# Patient Record
Sex: FEMALE | Race: OTHER | HISPANIC OR LATINO | Employment: FULL TIME | URBAN - METROPOLITAN AREA
[De-identification: names, ages, dates, MRNs, and addresses within clinical notes are randomized per-mention and may not be internally consistent; named-entity substitution may affect disease eponyms.]

---

## 2020-08-26 ENCOUNTER — APPOINTMENT (OUTPATIENT)
Dept: URGENT CARE | Facility: PHYSICIAN GROUP | Age: 20
End: 2020-08-26

## 2021-06-11 ENCOUNTER — TELEPHONE (OUTPATIENT)
Dept: SCHEDULING | Facility: IMAGING CENTER | Age: 21
End: 2021-06-11

## 2021-06-15 ENCOUNTER — OFFICE VISIT (OUTPATIENT)
Dept: MEDICAL GROUP | Facility: MEDICAL CENTER | Age: 21
End: 2021-06-15
Payer: COMMERCIAL

## 2021-06-15 VITALS
HEIGHT: 65 IN | DIASTOLIC BLOOD PRESSURE: 58 MMHG | TEMPERATURE: 97.3 F | WEIGHT: 145 LBS | OXYGEN SATURATION: 98 % | HEART RATE: 76 BPM | BODY MASS INDEX: 24.16 KG/M2 | SYSTOLIC BLOOD PRESSURE: 98 MMHG

## 2021-06-15 DIAGNOSIS — N92.0 MENORRHAGIA WITH REGULAR CYCLE: ICD-10-CM

## 2021-06-15 DIAGNOSIS — M54.9 LEFT-SIDED BACK PAIN, UNSPECIFIED BACK LOCATION, UNSPECIFIED CHRONICITY: ICD-10-CM

## 2021-06-15 DIAGNOSIS — M54.50 ACUTE LEFT-SIDED LOW BACK PAIN WITHOUT SCIATICA: ICD-10-CM

## 2021-06-15 DIAGNOSIS — F33.1 MODERATE EPISODE OF RECURRENT MAJOR DEPRESSIVE DISORDER (HCC): ICD-10-CM

## 2021-06-15 LAB
APPEARANCE UR: NORMAL
BILIRUB UR STRIP-MCNC: NEGATIVE MG/DL
COLOR UR AUTO: YELLOW
GLUCOSE UR STRIP.AUTO-MCNC: NEGATIVE MG/DL
KETONES UR STRIP.AUTO-MCNC: NEGATIVE MG/DL
LEUKOCYTE ESTERASE UR QL STRIP.AUTO: NEGATIVE
NITRITE UR QL STRIP.AUTO: NEGATIVE
PH UR STRIP.AUTO: 6 [PH] (ref 5–8)
PROT UR QL STRIP: NEGATIVE MG/DL
RBC UR QL AUTO: NORMAL
SP GR UR STRIP.AUTO: >=1.03
UROBILINOGEN UR STRIP-MCNC: NORMAL MG/DL

## 2021-06-15 PROCEDURE — 81002 URINALYSIS NONAUTO W/O SCOPE: CPT | Performed by: FAMILY MEDICINE

## 2021-06-15 PROCEDURE — 99204 OFFICE O/P NEW MOD 45 MIN: CPT | Performed by: FAMILY MEDICINE

## 2021-06-15 RX ORDER — NORETHINDRONE ACETATE AND ETHINYL ESTRADIOL .02; 1 MG/1; MG/1
1 TABLET ORAL DAILY
Qty: 84 TABLET | Refills: 3 | Status: SHIPPED | OUTPATIENT
Start: 2021-06-15 | End: 2021-10-25

## 2021-06-15 ASSESSMENT — PATIENT HEALTH QUESTIONNAIRE - PHQ9: CLINICAL INTERPRETATION OF PHQ2 SCORE: 0

## 2021-06-15 NOTE — PROGRESS NOTES
Subjective:     CC: Diagnoses of Left-sided back pain, unspecified back location, unspecified chronicity, Menorrhagia with regular cycle, Acute left-sided low back pain without sciatica, and Moderate episode of recurrent major depressive disorder (HCC) were pertinent to this visit.    HPI: Patient is a 20 y.o. female new patient who presents today to establish care.       Menorrhagia with regular cycle  Has severe pain with menstruation  Common to have vomiting  Unable to work on the first day  Very heavy.   Periods last about 5-7 days.   Has never been on birth control.   Use condoms.   Has been painful for years.   Denies any migraines with aura, reports she does get rather severe headaches but that is rare and never with aura.   No family history of blood clots or bleeding disorders.     Acute left-sided low back pain without sciatica  New problem. Patient was concerned about possible UTI.   However, pain is positional. Comes and goes. No pain with urination. UA was normal in office today.   No pain while sitting in the office today.     Moderate episode of recurrent major depressive disorder (HCC)  Patient reports long history of feeling depressed.   Used to have SI, but not for years.   Never been to therapy.   Would like referral for behavioral health  Feels blank/empty, hard time getting out of bed.   She works at a warehouse.   Has a boyfriend, good relationship. Family is here as well.       History reviewed. No pertinent past medical history.    Social History     Tobacco Use   • Smoking status: Never Smoker   • Smokeless tobacco: Never Used   Vaping Use   • Vaping Use: Never used   Substance Use Topics   • Alcohol use: Not Currently   • Drug use: Yes     Types: Marijuana       Current Outpatient Medications Ordered in Epic   Medication Sig Dispense Refill   • norethindrone-ethinyl estradiol (LOESTRIN 1/20, 21,) 1-20 MG-MCG per tablet Take 1 tablet by mouth every day. 84 tablet 3     No current  "Epic-ordered facility-administered medications on file.       Allergies:  Blueberry flavor    Health Maintenance: Completed    ROS:  Gen: no fevers/chill, no changes in weight  Eyes: no changes in vision  ENT: no sore throat, no hearing loss, no bloody nose  Pulm: no sob, no cough  CV: no chest pain, no palpitations  GI: no nausea/vomiting, no diarrhea  : no dysuria  MSk: no myalgias  Skin: no rash  Neuro: no headaches, no numbness/tingling  Heme/Lymph: no easy bruising      Objective:       Exam:  BP (!) 98/58 (BP Location: Right arm, Patient Position: Sitting, BP Cuff Size: Adult long)   Pulse 76   Temp 36.3 °C (97.3 °F) (Temporal)   Ht 1.651 m (5' 5\")   Wt 65.8 kg (145 lb)   LMP 05/23/2021   SpO2 98%   BMI 24.13 kg/m²  Body mass index is 24.13 kg/m².      General: Normal appearing. No distress.  HEAD: NCAT  EYES: conjunctiva clear, lids without ptosis, pupils equal  and reactive to light  EARS: ears normal shape and contour, R TM with cerumen impaction. L TM clear.   MOUTH: oropharynx is without erythema, edema or exudates.   Neck: Supple without masses. Thyroid is not enlarged. Normal ROM  Pulmonary: Clear to ausculation.  Normal effort. No rales, ronchi, or wheezing.  Cardiovascular: Regular rate and rhythm, no murmur. No LE edema  Neurologic: Grossly normal, no focal deficits  Lymph: No cervical or supraclavicular lymph nodes are palpable  Skin: Warm and dry.  No obvious lesions.  Musculoskeletal: Normal gait and station.   Psych: Normal mood and affect. Alert and oriented x3. Judgment and insight is normal.    Assessment & Plan:     20 y.o. female with the following -     1. Left-sided back pain, unspecified back location, unspecified chronicity  New problem.  UA clear.  Advised this is very unlikely to be UTI or kidney infection.  She is otherwise completely asymptomatic.  Pain is positional.  Comes and goes.  Likely musculoskeletal.  Advised some stretching and exercise.  - POCT Urinalysis  - Comp " Metabolic Panel; Future    2. Menorrhagia with regular cycle  Chronic problem, present for years.  Patient reports extremely heavy and painful periods.  Plan to start OCPs.  Ordered labs.  Plan follow-up in 2 months.  - TSH WITH REFLEX TO FT4; Future  - CBC WITH DIFFERENTIAL; Future  - norethindrone-ethinyl estradiol (LOESTRIN 1/20, 21,) 1-20 MG-MCG per tablet; Take 1 tablet by mouth every day.  Dispense: 84 tablet; Refill: 3    3. Moderate episode of recurrent major depressive disorder (HCC)  Chronic problem, new to discuss.  Patient reports that she has struggled with depression for many years.  Denies any suicidal ideation.  Hoping to start therapy.  Declines medication at this point.  Plan to follow-up in 2 month.  - REFERRAL TO BEHAVIORAL HEALTH      Return in about 4 weeks (around 7/13/2021) for For well woman exam/Pap smear..    Please note that this dictation was created using voice recognition software. I have made every reasonable attempt to correct obvious errors, but I expect that there are errors of grammar and possibly content that I did not discover before finalizing the note.

## 2021-06-15 NOTE — ASSESSMENT & PLAN NOTE
New problem. Patient was concered about possible UTI.   However, pain is positional.   No pain while sitting in the office today.

## 2021-06-15 NOTE — ASSESSMENT & PLAN NOTE
Has severe pain with menstruation  Common to have vomiting  Unable to work on the first day  Very heavy.   Periods last about 5-7 days.   Has never been on birth control.   Use condoms.   Has been painful for years.   Denies any migraines with aura, reports she does get rather severe headaches but that is rare and never with aura.   No family history of blood clots or bleeding disorders.

## 2021-06-15 NOTE — ASSESSMENT & PLAN NOTE
Patient reports long history of feeling depressed.   Used to have SI, but not for years.   Never been to therapy.   Would like referral for behavioral health  Feels blank/empty, hard time getting out of bed.   She works at a warehouse.   Has a boyfriend, good relationship. Family is here as well.

## 2021-07-21 ENCOUNTER — TELEPHONE (OUTPATIENT)
Dept: MEDICAL GROUP | Facility: MEDICAL CENTER | Age: 21
End: 2021-07-21

## 2021-10-25 ENCOUNTER — OFFICE VISIT (OUTPATIENT)
Dept: URGENT CARE | Facility: PHYSICIAN GROUP | Age: 21
End: 2021-10-25
Payer: COMMERCIAL

## 2021-10-25 ENCOUNTER — APPOINTMENT (OUTPATIENT)
Dept: RADIOLOGY | Facility: IMAGING CENTER | Age: 21
End: 2021-10-25
Attending: PHYSICIAN ASSISTANT
Payer: COMMERCIAL

## 2021-10-25 VITALS
HEART RATE: 66 BPM | SYSTOLIC BLOOD PRESSURE: 102 MMHG | HEIGHT: 65 IN | TEMPERATURE: 98.4 F | BODY MASS INDEX: 22.66 KG/M2 | DIASTOLIC BLOOD PRESSURE: 70 MMHG | OXYGEN SATURATION: 97 % | RESPIRATION RATE: 18 BRPM | WEIGHT: 136 LBS

## 2021-10-25 DIAGNOSIS — S92.421A CLOSED DISPLACED FRACTURE OF DISTAL PHALANX OF RIGHT GREAT TOE, INITIAL ENCOUNTER: ICD-10-CM

## 2021-10-25 DIAGNOSIS — M79.674 GREAT TOE PAIN, RIGHT: ICD-10-CM

## 2021-10-25 PROCEDURE — 99214 OFFICE O/P EST MOD 30 MIN: CPT | Performed by: PHYSICIAN ASSISTANT

## 2021-10-25 PROCEDURE — 73660 X-RAY EXAM OF TOE(S): CPT | Mod: TC,RT | Performed by: PHYSICIAN ASSISTANT

## 2021-10-25 NOTE — PROGRESS NOTES
"  Subjective:   Caterina Fall is a 20 y.o. female who presents today with   Chief Complaint   Patient presents with   • Foot Injury     pain right foot,swollen,pt dropped a box on her right foot,1 day        Foot Problem  This is a new problem. The current episode started today. The problem occurs constantly. The problem has been unchanged. The symptoms are aggravated by bending and walking. She has tried nothing for the symptoms. The treatment provided no relief.   Patient states she dropped of approximately 10 pound box onto her right great toe as she was moving boxes around her house.    PMH:  has no past medical history on file.  MEDS:   Current Outpatient Medications:   •  norethindrone-ethinyl estradiol (LOESTRIN 1/20, 21,) 1-20 MG-MCG per tablet, Take 1 tablet by mouth every day. (Patient not taking: Reported on 10/25/2021), Disp: 84 tablet, Rfl: 3  ALLERGIES:   Allergies   Allergen Reactions   • Blueberry Flavor      SURGHX: No past surgical history on file.  SOCHX:  reports that she has never smoked. She has never used smokeless tobacco. She reports previous alcohol use. She reports current drug use. Drug: Marijuana.  FH: Reviewed with patient, not pertinent to this visit.       Review of Systems   Musculoskeletal:        Right great toe pain        Objective:   /70 (BP Location: Left arm, Patient Position: Sitting, BP Cuff Size: Adult)   Pulse 66   Temp 36.9 °C (98.4 °F) (Temporal)   Resp 18   Ht 1.651 m (5' 5\")   Wt 61.7 kg (136 lb)   SpO2 97%   BMI 22.63 kg/m²   Physical Exam  Vitals and nursing note reviewed.   Constitutional:       General: She is not in acute distress.     Appearance: Normal appearance. She is well-developed. She is not ill-appearing or toxic-appearing.   HENT:      Head: Normocephalic and atraumatic.      Right Ear: Hearing normal.      Left Ear: Hearing normal.   Cardiovascular:      Rate and Rhythm: Normal rate.   Pulmonary:      Effort: Pulmonary effort is normal. "   Musculoskeletal:        Feet:       Comments: Tenderness palpation to the first digit of the right foot.  Neurovascular intact distally.  Less than 2 capillary refill distally.  Patient has full plantarflexion and dorsiflexion of the right foot but not without pain in the right toe.   Skin:     General: Skin is warm and dry.   Neurological:      Mental Status: She is alert.      Coordination: Coordination normal.   Psychiatric:         Mood and Affect: Mood normal.       DX TOE  FINDINGS:  2 mm linear shaped bone fragment appears to be present dorsal to the distal end of the tuft of the distal phalanx of the first digit. No other potential fractures are identified. There is no evidence of dislocation or malalignment.     IMPRESSION:     1.  Probable mildly displaced fracture from the dorsal aspect of the distal end of the distal phalanx of the first digit.  Assessment/Plan:   Assessment    1. Great toe pain, right  - DX-TOE(S) 2+ RIGHT; Future    2. Closed displaced fracture of distal phalanx of right great toe, initial encounter  Patient given walking boot today and recommend she wear this for the next 2 to 4 weeks and slowly wean off using regular shoes.  Recommend follow-up with orthopedic if symptoms not improving or follow-up with her primary care.  Differential diagnosis, natural history, supportive care, and indications for immediate follow-up discussed.   Patient given instructions and understanding of medications and treatment.    Rest, ice, elevate.  Patient agreeable to plan.  Greater than 30 minutes were spent reviewing patient's chart, examining and obtaining history from patient, and discussing plan of care.       Please note that this dictation was created using voice recognition software. I have made every reasonable attempt to correct obvious errors, but I expect that there are errors of grammar and possibly content that I did not discover before finalizing the note.    Fredi Kan PA-C

## 2021-10-28 ENCOUNTER — OCCUPATIONAL MEDICINE (OUTPATIENT)
Dept: URGENT CARE | Facility: PHYSICIAN GROUP | Age: 21
End: 2021-10-28
Payer: COMMERCIAL

## 2021-10-28 VITALS
OXYGEN SATURATION: 98 % | DIASTOLIC BLOOD PRESSURE: 72 MMHG | SYSTOLIC BLOOD PRESSURE: 108 MMHG | RESPIRATION RATE: 18 BRPM | HEIGHT: 65 IN | WEIGHT: 136 LBS | HEART RATE: 72 BPM | BODY MASS INDEX: 22.66 KG/M2 | TEMPERATURE: 98 F

## 2021-10-28 DIAGNOSIS — S92.421D CLOSED DISPLACED FRACTURE OF DISTAL PHALANX OF RIGHT GREAT TOE WITH ROUTINE HEALING, SUBSEQUENT ENCOUNTER: ICD-10-CM

## 2021-10-28 PROCEDURE — 99214 OFFICE O/P EST MOD 30 MIN: CPT | Performed by: PHYSICIAN ASSISTANT

## 2021-10-28 ASSESSMENT — PAIN SCALES - GENERAL: PAINLEVEL: 2=MINIMAL-SLIGHT

## 2021-10-28 NOTE — PROGRESS NOTES
"Subjective     Caterina Fall is a 20 y.o. female who presents with Toe Pain (wc new, 10/25/21, fracture of big toe, swelling. )      DOI 10/25/2021: Patient was initially seen on October 25 and states today that initially she feared that she would get in trouble for reporting as a work-related injury.  She returns today and states instead of the box dropping on her foot at home it indeed happened at work.  She states it occurred while she was receiving her cart and putting away 2 boxes the item and the box was loose and moved falling onto her foot hitting her right big toe.  She was seen on October 25 in urgent care and had x-rays done that showed probable distal phalanx fracture of the right great toe.  She was given a walking boot at that time.  She states she has been using ibuprofen a couple of times a day as needed and ice as well.  She has been using a crutch to ambulate more comfortably.  She has been resting and elevating the area.  She attempted to go to work  but was sent home because she could not tolerate standing.  They created a position to where she could sit at a desk doing desk work and this was much more comfortable for her.  Patient states pain is improving but the toe does have some new bruising.     HPI  Patient presents today for work-related injury as described above.  Review of Systems   Musculoskeletal:        Right great toe pain       PMH: No pertinent past medical history to this problem  MEDS: Medications were reviewed in Epic  ALLERGIES: Allergies were reviewed in Epic  SOCHX: Works as a   FH: No pertinent family history to this problem         Objective     /72   Pulse 72   Temp 36.7 °C (98 °F) (Temporal)   Resp 18   Ht 1.651 m (5' 5\")   Wt 61.7 kg (136 lb)   SpO2 98%   BMI 22.63 kg/m²      Physical Exam  Vitals and nursing note reviewed.   Constitutional:       General: She is not in acute distress.     Appearance: Normal appearance. She is well-developed. She is " not ill-appearing or toxic-appearing.   HENT:      Head: Normocephalic and atraumatic.      Right Ear: Hearing normal.      Left Ear: Hearing normal.   Cardiovascular:      Rate and Rhythm: Normal rate.   Pulmonary:      Effort: Pulmonary effort is normal.   Musculoskeletal:      Comments: Right great toe as described below   Skin:     General: Skin is warm and dry.   Neurological:      Mental Status: She is alert.      Coordination: Coordination normal.   Psychiatric:         Mood and Affect: Mood normal.         Tenderness to palpation of the distal aspect of the right great toe.  Neurovascular intact distally.  Less than 2 capillary refill.  Ecchymosis to the distal portion of the right great toe with mild swelling.  Patient is able to flex and extend against resistance with the right great toe.  Antalgic gait favoring the right side.  Using a crutch for ambulation.              Assessment & Plan   1. Closed displaced fracture of distal phalanx of right great toe with routine healing, subsequent encounter         Patient should continue wearing the walking boot and using the crutch as needed for ambulation.  Recommend slowly weaning off of the walking boot in about a week.  Reevaluate in 2 weeks at that time may trial walking without the walking boot.  Suspect that probable fracture will heal in 2 to 4 weeks.  Continue with rest, ice, elevation and ibuprofen or Tylenol per 's instructions over-the-counter.  Please note that this dictation was created using voice recognition software. I have made every reasonable attempt to correct obvious errors, but I expect that there may be errors of grammar and possibly content that I did not discover before finalizing the note.   Greater than 30 minutes were spent reviewing patient's chart, examining and obtaining history from patient, and discussing plan of care.

## 2021-10-28 NOTE — LETTER
[facility]  [address] [place] [address]  Phone:  [phone] - Fax:  [phone]   [facility] Progress Report and Disability Certification  Date of Service: 10/28/2021   No Show:  No  Date / Time of Next Visit: 11/11/2021   Claim Information   Patient Name: Caterina Fall  Claim Number:     Employer:   [employer] Date of Injury: 10/25/2021     Insurer / TPA: [employer]  ID / SSN:     Occupation:   Diagnosis: The encounter diagnosis was Closed displaced fracture of distal phalanx of right great toe with routine healing, subsequent encounter.    Medical Information   Related to Industrial Injury? Yes    Subjective Complaints:  DOI 10/25/2021: Patient was initially seen on October 25 and states today that initially she feared that she would get in trouble for reporting as a work-related injury.  She returns today and states instead of the box dropping on her foot at home it indeed happened at work.  She states it occurred while she was receiving her cart and putting away 2 boxes the item and the box was loose and moved falling onto her foot hitting her right big toe.  She was seen on October 25 in urgent care and had x-rays done that showed probable distal phalanx fracture of the right great toe.  She was given a walking boot at that time.  She states she has been using ibuprofen a couple of times a day as needed and ice as well.  She has been using a crutch to ambulate more comfortably.  She has been resting and elevating the area.  She attempted to go to work  but was sent home because she could not tolerate standing.  They created a position to where she could sit at a desk doing desk work and this was much more comfortable for her.  Patient states pain is improving but the toe does have some new bruising.   Objective Findings: Tenderness to palpation of the distal aspect of the right great toe.  Neurovascular intact distally.  Less than 2  capillary refill.  Ecchymosis to the distal portion of the right great toe with mild swelling.  Patient is able to flex and extend against resistance with the right great toe.  Antalgic gait favoring the right side.  Using a crutch for ambulation.   Pre-Existing Condition(s):     Assessment:   Initial Visit    Status: Additional Care Required  Permanent Disability:No    Plan:      Diagnostics:      Comments:       Disability Information   Status: Released to Restricted Duty    From:  10/28/2021  Through: 2021 Restrictions are: Temporary   Physical Restrictions   Sitting:    Standing:  < or = to 2 hrs/day Stooping:    Bending:      Squatting:    Walking:  < or = to 4 hrs/day Climbin hrs/day Pushing:      Pulling:    Other:    Reaching Above Shoulder (L):   Reaching Above Shoulder (R):       Reaching Below Shoulder (L):    Reaching Below Shoulder (R):      Not to exceed Weight Limits   Carrying(hrs):   Weight Limit(lb):   Lifting(hrs):   Weight  Limit(lb):     Comments: Patient should continue wearing the walking boot and using the crutch as needed for ambulation.  Recommend slowly weaning off of the walking boot in about a week.  Reevaluate in 2 weeks at that time may trial walking without the walking boot.  Suspect that probable fracture will heal in 2 to 4 weeks.  Continue with rest, ice, elevation and ibuprofen or Tylenol per 's instructions over-the-counter.    Repetitive Actions   Hands: i.e. Fine Manipulations from Grasping:     Feet: i.e. Operating Foot Controls:     Driving / Operate Machinery:     Health Care Provider’s Original or Electronic Signature  Fredi Kan P.A.-C. Health Care Provider’s Original or Electronic Signature    Donny Hatch MD         Clinic Name / Location: [address] [address] Clinic Phone Number: Dept: [phone]   Appointment Time: 2:10 Pm Visit Start Time: 2:40 PM   Check-In Time:  2:27 Pm Visit Discharge Time:  3:01 Pm    Original-Treating Physician or Chiropractor    Page 2-Insurer/TPA    Page 3-Employer    Page 4-Employee

## 2021-10-28 NOTE — LETTER
"EMPLOYEE’S CLAIM FOR COMPENSATION/ REPORT OF INITIAL TREATMENT  FORM C-4    EMPLOYEE’S CLAIM - PROVIDE ALL INFORMATION REQUESTED   First Name  Caterina Last Name  Juan David Birthdate                    2000                Sex  female Claim Number (Insurer’s Use Only)    Home Address  [address] Age  20 y.o. Height  1.651 m (5' 5\") Weight  61.7 kg (136 lb) [facility]     [facility] Zip  [address] Telephone  [phone] (home)    Mailing Address  [address] [facility] Zip  [address] Primary Language Spoken  English    Insurer   Third-Party   [employer]   Employee's Occupation (Job Title) When Injury or Occupational Disease Occurred      Employer's Name/Company Name    [employer] Telephone  [phone]    Office Mail Address (Number and Street)   [address]  Zip  [address]    Date of Injury  10/25/2021               Hours Injury  2:20 PM Date Employer Notified  10/25/2021 Last Day of Work after Injury     or Occupational Disease  10/28/2021 Supervisor to Whom Injury     Reported  Jenni MICHELLE   Address or Location of Accident (if applicable)  Work [1]   What were you doing at the time of accident? (if applicable)  receiving a cart    How did this injury or occupational disease occur? (Be specific an answer in detail. Use additional sheet if necessary)  while receiving my cart I was putting away 2 wade boxes away in a B location and the Item in the box moved falling on to my foot hitting my right big toe   If you believe that you have an occupational disease, when did you first have knowledge of the disability and it relationship to your employment?  N/A Witnesses to the Accident  Humberto CASTRO      Nature of Injury or Occupational Disease  Fracture  Part(s) of Body Injured or Affected  Toe (R), Foot (R), N/A    I certify that the above is true and correct " to the best of my knowledge and that I have provided this information in order to obtain the benefits of [place]  I hereby authorize any physician, chiropractor, surgeon, practitioner, or other person, any hospital, including [facility] or [facility], any medical service organization, any insurance company, or other institution or organization to release to each other, any medical or other information, including benefits paid or payable, pertinent to this injury or disease, except information relative to diagnosis, treatment and/or counseling for AIDS, psychological conditions, alcohol or controlled substances, for which I must give specific authorization.  A Photostat of this authorization shall be as valid as the original.     Date   Place Employee’s Original or  *Electronic Signature   THIS REPORT MUST BE COMPLETED AND MAILED WITHIN 3 WORKING DAYS OF TREATMENT   Place  [facility]  Name of Facility  [place]   Date  10/28/2021 Diagnosis and Description of Injury or Occupational Disease  (S92.421D) Closed displaced fracture of distal phalanx of right great toe with routine healing, subsequent encounter Is there evidence the injured employee was under the influence of alcohol and/or another controlled substance at the time of accident?  ? No ? Yes (if yes, please explain)    Hour  2:40 PM   The encounter diagnosis was Closed displaced fracture of distal phalanx of right great toe with routine healing, subsequent encounter. No   Treatment  Patient should continue wearing the walking boot and using the crutch as needed for ambulation.  Recommend slowly weaning off of the walking boot in about a week.  Reevaluate in 2 weeks at that time may trial walking without the walking boot.  Suspect that probable fracture will heal in 2 to 4 weeks.  Continue with rest, ice, elevation and  ibuprofen or Tylenol per 's instructions over-the-counter.  Have you advised the patient to remain off work five days or     more?    X-Ray Findings  Positive   ? Yes Indicate dates:   From   To      From information given by the employee, together with medical evidence, can        you directly connect this injury or occupational disease as job incurred?  Yes ? No If no, is the injured employee capable of:  ? full duty  No ? modified duty  Yes   Is additional medical care by a physician indicated?  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   Do you know of any previous injury or disease contributing to this condition or occupational disease?  ? Yes ? No (Explain if yes)                          No   Date  10/28/2021 Print Health Care Provider's   Shawn Kan P.A.-C. I certify the employer’s copy of  this form was mailed on:   Address  [address] Insurer’s Use Only     [facility]  [address]    Provider’s Tax ID Number  [id number] Telephone  Dept: [phone]             Health Care Provider’s Original or Electronic Signature  e-SHAWN Vincent P.A.-C. Degree (MD,DO, DC,PAMacC,APRN)   PAMacC      * Complete and attach Release of Information (Form C-4A) when injured employee signs C-4 Form electronically  ORIGINAL - TREATING HEALTHCARE PROVIDER PAGE 2 - INSURER/TPA PAGE 3 - EMPLOYER PAGE 4 - EMPLOYEE             Form C-4 (rev.08/21)           BRIEF DESCRIPTION OF RIGHTS AND BENEFITS  (Pursuant to [place]RS 616C.050)    Notice of Injury or Occupational Disease (Incident Report Form C-1): If an injury or occupational disease (OD) arises out of and in the course of employment, you must provide written notice to your employer as soon as practicable, but no later than 7 days after the accident or OD. Your employer shall maintain a sufficient supply of the required forms.    Claim for  "Compensation (Form C-4): If medical treatment is sought, the form C-4 is available at the place of initial treatment. A completed \"Claim for Compensation\" (Form C-4) must be filed within 90 days after an accident or OD. The treating physician or chiropractor must, within 3 working days after treatment, complete and mail to the employer, the employer's insurer and third-party , the Claim for Compensation.    Medical Treatment: If you require medical treatment for your on-the-job injury or OD, you may be required to select a physician or chiropractor from a list provided by your workers’ compensation insurer, if it has contracted with an Organization for Managed Care (MCO) or Preferred Provider Organization (PPO) or providers of health care. If your employer has not entered into a contract with an MCO or PPO, you may select a physician or chiropractor from the Panel of Physicians and Chiropractors. Any medical costs related to your industrial injury or OD will be paid by your insurer.    Temporary Total Disability (TTD): If your doctor has certified that you are unable to work for a period of at least 5 consecutive days, or 5 cumulative days in a 20-day period, or places restrictions on you that your employer does not accommodate, you may be entitled to TTD compensation.    Temporary Partial Disability (TPD): If the wage you receive upon reemployment is less than the compensation for TTD to which you are entitled, the insurer may be required to pay you TPD compensation to make up the difference. TPD can only be paid for a maximum of 24 months.    Permanent Partial Disability (PPD): When your medical condition is stable and there is an indication of a PPD as a result of your injury or OD, within 30 days, your insurer must arrange for an evaluation by a rating physician or chiropractor to determine the degree of your PPD. The amount of your PPD award depends on the date of injury, the results of the PPD " evaluation, your age and wage.    Permanent Total Disability (PTD): If you are medically certified by a treating physician or chiropractor as permanently and totally disabled and have been granted a PTD status by your insurer, you are entitled to receive monthly benefits not to exceed 66 2/3% of your average monthly wage. The amount of your PTD payments is subject to reduction if you previously received a lump-sum PPD award.    Vocational Rehabilitation Services: You may be eligible for vocational rehabilitation services if you are unable to return to the job due to a permanent physical impairment or permanent restrictions as a result of your injury or occupational disease.    Transportation and Per Jarek Reimbursement: You may be eligible for travel expenses and per jarek associated with medical treatment.    Reopening: You may be able to reopen your claim if your condition worsens after claim closure.     Appeal Process: If you disagree with a written determination issued by the insurer or the insurer does not respond to your request, you may appeal to the Department of Administration, , by following the instructions contained in your determination letter. You must appeal the determination within 70 days from the date of the determination letter at [address], [address], [place], [place] [address], or [address], [address], [place] [address]. If you disagree with the  decision, you may appeal to the Department of Administration, . You must file your appeal within 30 days from the date of the  decision letter at [address], [address], [place], [place] [address], or [address], [address], [place] [address]. If you disagree with a decision of an , you may file a petition for judicial review with the District Court. You must do so within 30 days of the Appeal Officer’s decision. You may be  represented by an  at your own expense or you may contact the [facility] for possible representation.    [place]  for Injured Workers (NAIW): If you disagree with a  decision, you may request that NAIW represent you without charge at an  Hearing. For information regarding denial of benefits, you may contact the [facility] at: [address], [address], [place] [address], [phone], or [address], [place] [address], [phone]    To File a Complaint with the Division: If you wish to file a complaint with the  of the Division of Industrial Relations (DIR),  please contact the Workers’ Compensation Section, [address], [address], [place], [place] [address], telephone [phone]) [phone], or [address], [address], [place], [place] [address], telephone [phone].    For assistance with Workers’ Compensation Issues: You may contact the [facility] Office for Consumer Health Assistance, [address], [address], [place], [place] [address], Toll Free 1-465.675.2792, Web site: http://[facility].[place].gov/Programs/JEFFREY E-mail: [email or web address]              __________________________________________________________________                                    _________________            Employee Name / Signature                                                                                                                            Date                                                                                                                                                                                                                              D-2 (rev. 10/20)

## 2021-11-11 ENCOUNTER — OCCUPATIONAL MEDICINE (OUTPATIENT)
Dept: URGENT CARE | Facility: PHYSICIAN GROUP | Age: 21
End: 2021-11-11
Payer: COMMERCIAL

## 2021-11-11 VITALS
SYSTOLIC BLOOD PRESSURE: 98 MMHG | BODY MASS INDEX: 22.66 KG/M2 | HEART RATE: 71 BPM | TEMPERATURE: 99.7 F | WEIGHT: 136 LBS | RESPIRATION RATE: 18 BRPM | HEIGHT: 65 IN | DIASTOLIC BLOOD PRESSURE: 68 MMHG | OXYGEN SATURATION: 96 %

## 2021-11-11 DIAGNOSIS — S92.401A CLOSED DISPLACED FRACTURE OF PHALANX OF RIGHT GREAT TOE, UNSPECIFIED PHALANX, INITIAL ENCOUNTER: ICD-10-CM

## 2021-11-11 PROCEDURE — 99213 OFFICE O/P EST LOW 20 MIN: CPT | Performed by: NURSE PRACTITIONER

## 2021-11-11 NOTE — PROGRESS NOTES
"Subjective     Caterina Fall is a 21 y.o. female who presents with Follow-Up (WC F/V, DOI- 10/25/2021, Injury- (R) foot, 90% better )      DOI: 10/25/21. Third visit  Patient returns today for follow-up.  She was initially seen on 10/25 after dropping a large box on her right big toe.  X-ray demonstrated a small fracture.  Patient has been in a walking boot and returns today for reevaluation and trial without the walking boot.  Patient states that her pain has resolved.  She does not have any pain with ambulation and has attempted ambulation at home without her walking boot.  She believes that she is ready to discontinue use of her walking boot at work and would like a trial of full duty.     Other  This is a new problem. Episode onset: 10/25/21. The problem occurs constantly. The problem has been rapidly improving. Treatments tried: rest, immobilization. The treatment provided significant relief.       Review of Systems   Musculoskeletal:        Toe injury   All other systems reviewed and are negative.             Objective     BP (!) 98/68   Pulse 71   Temp 37.6 °C (99.7 °F) (Temporal)   Resp 18   Ht 1.651 m (5' 5\")   Wt 61.7 kg (136 lb)   SpO2 96%   BMI 22.63 kg/m²      Physical Exam  Vitals reviewed.   Constitutional:       Appearance: Normal appearance.   Neurological:      General: No focal deficit present.      Mental Status: She is alert and oriented to person, place, and time.   Psychiatric:         Mood and Affect: Mood normal.         Behavior: Behavior normal.         Slight discoloration to the base of the toenail.  Toenail is firmly attached.  Digit is neurovascularly intact.  No point tenderness.  No deformity.                   Assessment & Plan        1. Closed displaced fracture of phalanx of right great toe, unspecified phalanx, subsequent encounter    -Trial of full duty without walking boot  -Ibuprofen as needed  -Return on 11/18/2021 for follow-up  -Consider discharge for MMI if she " tolerates full duty

## 2021-11-11 NOTE — LETTER
[facility]  [address]  [place] [address]  Phone:  [phone] - Fax:  [phone]   [facility] Progress Report and Disability Certification  Date of Service: 11/11/2021   No Show:  No  Date / Time of Next Visit: 11/18/2021 8:00 AM   Claim Information   Patient Name: Caterina Fall  Claim Number:     Employer:   [employer] Date of Injury: 10/25/2021     Insurer / TPA: [employer]  ID / SSN:     Occupation:   Diagnosis: The encounter diagnosis was Closed displaced fracture of phalanx of right great toe, unspecified phalanx, subsequent encounter.    Medical Information   Related to Industrial Injury? Yes    Subjective Complaints:  DOI: 10/25/21. Third visit  Patient returns today for follow-up.  She was initially seen on 10/25 after dropping a large box on her right big toe.  X-ray demonstrated a small fracture.  Patient has been in a walking boot and returns today for reevaluation and trial without the walking boot.  Patient states that her pain has resolved.  She does not have any pain with ambulation and has attempted ambulation at home without her walking boot.  She believes that she is ready to discontinue use of her walking boot at work and would like a trial of full duty.   Objective Findings: Slight discoloration to the base of the toenail.  Toenail is firmly attached.  Digit is neurovascularly intact.  No point tenderness.  No deformity.   Pre-Existing Condition(s):     Assessment:   Condition Improved    Status: Additional Care Required  Permanent Disability:No    Plan: Medication  Comments:Ibuprofen as needed    Diagnostics:      Comments:       Disability Information   Status: Released to Full Duty    From:  11/11/2021  Through: 11/18/2021 Restrictions are: Temporary   Physical Restrictions   Sitting:    Standing:    Stooping:    Bending:      Squatting:    Walking:    Climbing:    Pushing:      Pulling:    Other:    Reaching Above  Shoulder (L):   Reaching Above Shoulder (R):       Reaching Below Shoulder (L):    Reaching Below Shoulder (R):      Not to exceed Weight Limits   Carrying(hrs):   Weight Limit(lb):   Lifting(hrs):   Weight  Limit(lb):     Comments: Patient is given a trial of full duty without walking boot.  Return in 1 week.  If she is tolerating well consider discharge for MMI.    Repetitive Actions   Hands: i.e. Fine Manipulations from Grasping:     Feet: i.e. Operating Foot Controls:     Driving / Operate Machinery:     Health Care Provider’s Original or Electronic Signature  Cathey J Hamman, A.PDON. Health Care Provider’s Original or Electronic Signature    Donny Hatch MD         Clinic Name / Location: [address] [address] Clinic Phone Number: Dept: [phone]   Appointment Time: 8:00 Am Visit Start Time: 8:18 AM   Check-In Time:  8:04 Am Visit Discharge Time:  8:55AM   Original-Treating Physician or Chiropractor    Page 2-Insurer/TPA    Page 3-Employer    Page 4-Employee

## 2021-11-18 ENCOUNTER — OCCUPATIONAL MEDICINE (OUTPATIENT)
Dept: URGENT CARE | Facility: PHYSICIAN GROUP | Age: 21
End: 2021-11-18
Payer: COMMERCIAL

## 2021-11-18 VITALS
RESPIRATION RATE: 12 BRPM | OXYGEN SATURATION: 99 % | SYSTOLIC BLOOD PRESSURE: 102 MMHG | BODY MASS INDEX: 22.66 KG/M2 | DIASTOLIC BLOOD PRESSURE: 66 MMHG | HEART RATE: 82 BPM | HEIGHT: 65 IN | TEMPERATURE: 98.9 F | WEIGHT: 136 LBS

## 2021-11-18 DIAGNOSIS — S92.401A CLOSED DISPLACED FRACTURE OF PHALANX OF RIGHT GREAT TOE, UNSPECIFIED PHALANX, INITIAL ENCOUNTER: ICD-10-CM

## 2021-11-18 PROCEDURE — 99213 OFFICE O/P EST LOW 20 MIN: CPT | Performed by: PHYSICIAN ASSISTANT

## 2021-11-18 ASSESSMENT — ENCOUNTER SYMPTOMS
EYE REDNESS: 0
FEVER: 0
NAUSEA: 0
EYE DISCHARGE: 0
COUGH: 0
VOMITING: 0

## 2021-11-18 NOTE — LETTER
"   [facility]  [address] [place] [address]  Phone:  [phone] - Fax:  [phone]   [facility] Progress Report and Disability Certification  Date of Service: 11/18/2021   No Show:  No  Date / Time of Next Visit:     Claim Information   Patient Name: Caterina Fall  Claim Number:     Employer:    Date of Injury: 10/25/2021     Insurer / TPA: [employer]  ID / SSN:     Occupation:   Diagnosis: The encounter diagnosis was Closed displaced fracture of phalanx of right great toe, unspecified phalanx, subsequent encounter.    Medical Information   Related to Industrial Injury?      Subjective Complaints:    The patient presents to clinic for Workmen's Comp. follow-up.    DOI: 10/25/2021 -copied from original visit- \" Patient was initially seen on October 25 and states today that initially she feared that she would get in trouble for reporting as a work-related injury.  She returns today and states instead of the box dropping on her foot at home it indeed happened at work.  She states it occurred while she was receiving her cart and putting away 2 boxes the item and the box was loose and moved falling onto her foot hitting her right big toe.  She was seen on October 25 in urgent care and had x-rays done that showed probable distal phalanx fracture of the right great toe.  She was given a walking boot at that time.  She states she has been using ibuprofen a couple of times a day as needed and ice as well.  She has been using a crutch to ambulate more comfortably.  She has been resting and elevating the area.  She attempted to go to work  but was sent home because she could not tolerate standing.  They created a position to where she could sit at a desk doing desk work and this was much more comfortable for her.  Patient states pain is improving but the toe does have some new bruising.\"    Follow-up #3 - Today, the patient reports significant " improvement to her right great toe. The patient notes intermittent pain to the right great toe, which mostly occurs after prolonged walking.  The patient reports no associated swelling.  The patient states she is still experiencing some bruising/dried blood under her right great toenail.  The patient states the toenail of her right great toe is still attached.  The patient states she is no longer needing the walking boot.  The patient reports no pain with walking.  The patient has not required any OTC medications for her current symptoms.  The patient states overall she is approximately 90% improved.  The patient is requesting discharge/MMI at this time.     Objective Findings:   Right Great Toe:  No tenderness to palpation.  No swelling.  No ecchymosis.  Slight ecchymosis to the proximal right great toenail overlying the nail bed.  The patient's toenail is painted and is unclear if there is a subungual hematoma present  ROM intact -the patient demonstrates full active range of motion of the right great toe  Neurovascular intact distally  Strength 5/5 -flexion/extension of the right great toe against resistance  Normal gait    Pre-Existing Condition(s):     Assessment:   Condition Improved    Status: Discharged /  MMI  Permanent Disability:     Plan:      Diagnostics:      Comments:       Disability Information   Status: Released to Full Duty    From:     Through:   Restrictions are:     Physical Restrictions   Sitting:    Standing:    Stooping:    Bending:      Squatting:    Walking:    Climbing:    Pushing:      Pulling:    Other:    Reaching Above Shoulder (L):   Reaching Above Shoulder (R):       Reaching Below Shoulder (L):    Reaching Below Shoulder (R):      Not to exceed Weight Limits   Carrying(hrs):   Weight Limit(lb):   Lifting(hrs):   Weight  Limit(lb):     Comments:   Plan:  Discharge/MMI - D39 provided  OTC NSAIDs for pain/discomfort   RICE  Weight-bearing as tolerated  Follow-up with PCP   Return to  clinic or go tot he ED if symptoms worsen or fail to improve, or if the patient should develop worsening/increasing pain/tenderness, swelling, bruising, redness or warmth to the affected area, decreased ROM, numbness, tingling or weakness, difficulty walking, fever/chills, and/or any concerning symptoms.     Repetitive Actions   Hands: i.e. Fine Manipulations from Grasping:     Feet: i.e. Operating Foot Controls:     Driving / Operate Machinery:     Health Care Provider’s Original or Electronic Signature  Sofi Strickland P.A.-C. Health Care Provider’s Original or Electronic Signature    Donny Hatch MD         Clinic Name / Location: [facility] [address] [address] Clinic Phone Number: Dept: [phone]   Appointment Time: 2:00 Pm Visit Start Time: 2:06 PM   Check-In Time:  1:49 Pm Visit Discharge Time:  2:51 PM   Original-Treating Physician or Chiropractor    Page 2-Insurer/TPA    Page 3-Employer    Page 4-Employee

## 2021-11-18 NOTE — PROGRESS NOTES
"Subjective     Caterina Fall is a 21 y.o. female who presents with Toe Injury            HPI    The patient presents to clinic for Workmen's Comp. follow-up.    DOI: 10/25/2021 -copied from original visit- \" Patient was initially seen on October 25 and states today that initially she feared that she would get in trouble for reporting as a work-related injury.  She returns today and states instead of the box dropping on her foot at home it indeed happened at work.  She states it occurred while she was receiving her cart and putting away 2 boxes the item and the box was loose and moved falling onto her foot hitting her right big toe.  She was seen on October 25 in urgent care and had x-rays done that showed probable distal phalanx fracture of the right great toe.  She was given a walking boot at that time.  She states she has been using ibuprofen a couple of times a day as needed and ice as well.  She has been using a crutch to ambulate more comfortably.  She has been resting and elevating the area.  She attempted to go to work  but was sent home because she could not tolerate standing.  They created a position to where she could sit at a desk doing desk work and this was much more comfortable for her.  Patient states pain is improving but the toe does have some new bruising.\"    Follow-up #3 - Today, the patient reports significant improvement to her right great toe. The patient notes intermittent pain to the right great toe, which mostly occurs after prolonged walking.  The patient reports no associated swelling.  The patient states she is still experiencing some bruising/dried blood under her right great toenail.  The patient states the toenail of her right great toe is still attached.  The patient states she is no longer needing the walking boot.  The patient reports no pain with walking.  The patient has not required any OTC medications for her current symptoms.  The patient states overall she is approximately 90% " "improved.  The patient is requesting discharge/MMI at this time.    PMH: Reviewed in Epic, no pertinent findings.  MEDS: Reviewed in Epic.  ALLERGIES: Reviewed in Epic.  SURGHX: Reviewed in Epic.  SOCHX: Reviewed in Epic.  FH: Family history was reviewed, no pertinent findings to report.      Review of Systems   Constitutional: Negative for fever.   HENT: Negative for congestion.    Eyes: Negative for discharge and redness.   Respiratory: Negative for cough.    Gastrointestinal: Negative for nausea and vomiting.   Musculoskeletal: Positive for joint pain (right great toe).              Objective     /66 (BP Location: Left arm, Patient Position: Sitting, BP Cuff Size: Adult)   Pulse 82   Temp 37.2 °C (98.9 °F) (Temporal)   Resp 12   Ht 1.651 m (5' 5\")   Wt 61.7 kg (136 lb)   SpO2 99%   BMI 22.63 kg/m²      Physical Exam  Constitutional:       General: She is not in acute distress.     Appearance: Normal appearance. She is well-developed. She is not ill-appearing.   HENT:      Head: Normocephalic and atraumatic.      Right Ear: External ear normal.      Left Ear: External ear normal.   Eyes:      Extraocular Movements: Extraocular movements intact.      Conjunctiva/sclera: Conjunctivae normal.   Cardiovascular:      Rate and Rhythm: Normal rate.   Pulmonary:      Effort: Pulmonary effort is normal.   Musculoskeletal:      Cervical back: Normal range of motion and neck supple.      Comments:   Right Great Toe:  No tenderness to palpation.  No swelling.  No ecchymosis.  Slight ecchymosis to the proximal right great toenail overlying the nail bed.  The patient's toenail is painted and is unclear if there is a subungual hematoma present  ROM intact -the patient demonstrates full active range of motion of the right great toe  Neurovascular intact distally  Strength 5/5 -flexion/extension of the right great toe against resistance  Normal gait   Skin:     General: Skin is warm and dry.   Neurological:      " Mental Status: She is alert and oriented to person, place, and time.                             Assessment & Plan          1. Closed displaced fracture of phalanx of right great toe, unspecified phalanx, subsequent encounter    Differential diagnoses, supportive care, and indications for immediate follow-up discussed with patient.   Instructed to return to clinic or nearest emergency department for any change in condition, further concerns, or worsening of symptoms.    Plan:  Discharge/MMI - D39 provided  OTC NSAIDs for pain/discomfort   RICE  Weight-bearing as tolerated  Follow-up with PCP   Return to clinic or go tot he ED if symptoms worsen or fail to improve, or if the patient should develop worsening/increasing pain/tenderness, swelling, bruising, redness or warmth to the affected area, decreased ROM, numbness, tingling or weakness, difficulty walking, fever/chills, and/or any concerning symptoms.     Discussed plan with the patient, and she agrees to the above.     I personally reviewed prior external notes and test results pertinent to today's visit.  I have independently reviewed and interpreted all diagnostics ordered during this urgent care visit.     Time spent evaluating this patient was at least 30 minutes and includes preparing for visit, obtaining history, exam and evaluation, ordering labs/tests/procedures/medications, independent interpretation, and counseling/education.    Please note that this dictation was created using voice recognition software. I have made every reasonable attempt to correct obvious errors, but I expect that there may be errors of grammar and possibly content that I did not discover before finalizing the note.     This note was electronically signed by Sofi Strickland PA-C

## 2021-12-16 ENCOUNTER — OFFICE VISIT (OUTPATIENT)
Dept: URGENT CARE | Facility: PHYSICIAN GROUP | Age: 21
End: 2021-12-16
Payer: COMMERCIAL

## 2021-12-16 VITALS
OXYGEN SATURATION: 97 % | DIASTOLIC BLOOD PRESSURE: 62 MMHG | BODY MASS INDEX: 23.32 KG/M2 | HEART RATE: 80 BPM | WEIGHT: 140 LBS | TEMPERATURE: 99.3 F | SYSTOLIC BLOOD PRESSURE: 110 MMHG | HEIGHT: 65 IN | RESPIRATION RATE: 12 BRPM

## 2021-12-16 DIAGNOSIS — H92.03 OTALGIA OF BOTH EARS: ICD-10-CM

## 2021-12-16 DIAGNOSIS — H61.23 BILATERAL IMPACTED CERUMEN: ICD-10-CM

## 2021-12-16 DIAGNOSIS — H60.501 ACUTE OTITIS EXTERNA OF RIGHT EAR, UNSPECIFIED TYPE: ICD-10-CM

## 2021-12-16 PROCEDURE — 99213 OFFICE O/P EST LOW 20 MIN: CPT | Performed by: PHYSICIAN ASSISTANT

## 2021-12-16 RX ORDER — NEOMYCIN SULFATE, POLYMYXIN B SULFATE, HYDROCORTISONE 3.5; 10000; 1 MG/ML; [USP'U]/ML; MG/ML
1 SOLUTION/ DROPS AURICULAR (OTIC) 4 TIMES DAILY
Qty: 3 ML | Refills: 0 | Status: SHIPPED | OUTPATIENT
Start: 2021-12-16 | End: 2021-12-23

## 2021-12-16 ASSESSMENT — ENCOUNTER SYMPTOMS
SORE THROAT: 0
FEVER: 0
DIZZINESS: 0
VOMITING: 0
HEADACHES: 0
NAUSEA: 0
CHILLS: 0
SINUS PAIN: 0
COUGH: 0

## 2021-12-16 NOTE — PROGRESS NOTES
Subjective:   Caterina Fall is a 21 y.o. female who presents for Ear Pain (started yesturday, both ears having pain, and cant hear out of them, and left ear has a bump on it thats hurt to the touch )      HPI  21 y.o. female presents to urgent care with new problem to provider of ear fullness and decreased hearing, right greater than left.  Patient reports this has been an ongoing issue over the last 6 months.  She was previously seen by her primary care provider for similar who recommended over-the-counter eardrops for wax softening.  Patient denies associated symptoms of congestion, sore throat, cough.  No fevers or headaches. Denies other associated aggravating or alleviating factors.     Review of Systems   Constitutional: Negative for chills and fever.   HENT: Positive for ear pain. Negative for congestion, ear discharge, hearing loss, sinus pain, sore throat and tinnitus.         Decreased hearing, ear fullness   Respiratory: Negative for cough.    Gastrointestinal: Negative for nausea and vomiting.   Neurological: Negative for dizziness and headaches.       Patient Active Problem List   Diagnosis   • Menorrhagia with regular cycle   • Acute left-sided low back pain without sciatica   • Moderate episode of recurrent major depressive disorder (HCC)     History reviewed. No pertinent surgical history.  Social History     Tobacco Use   • Smoking status: Never Smoker   • Smokeless tobacco: Never Used   Vaping Use   • Vaping Use: Never used   Substance Use Topics   • Alcohol use: Not Currently   • Drug use: Yes     Types: Marijuana      Family History   Problem Relation Age of Onset   • Diabetes Maternal Grandmother       (Allergies, Medications, & Tobacco/Substance Use were reconciled by the Medical Assistant and reviewed by myself. The family history is prepopulated)     Objective:     /62 (BP Location: Right arm, Patient Position: Sitting, BP Cuff Size: Adult)   Pulse 80   Temp 37.4 °C (99.3 °F)  "(Temporal)   Resp 12   Ht 1.651 m (5' 5\")   Wt 63.5 kg (140 lb)   SpO2 97%   BMI 23.30 kg/m²     Physical Exam  Vitals reviewed.   Constitutional:       Appearance: Normal appearance.   HENT:      Head: Normocephalic and atraumatic.      Right Ear: There is impacted cerumen.      Left Ear: There is impacted cerumen.      Nose: Nose normal.   Cardiovascular:      Rate and Rhythm: Normal rate.   Pulmonary:      Effort: Pulmonary effort is normal. No respiratory distress.   Musculoskeletal:      Cervical back: Normal range of motion and neck supple.   Skin:     General: Skin is warm and dry.   Neurological:      General: No focal deficit present.      Mental Status: She is alert and oriented to person, place, and time.   Psychiatric:         Mood and Affect: Mood normal.         Behavior: Behavior normal.         Thought Content: Thought content normal.         Judgment: Judgment normal.         Procedure: Cerumen Removal  Risks and benefits of procedure discussed  Cerumen removed with curette and lavage after softening agent instilled  Patient tolerated well  Post procedure exam with clear canal and normal left TM.  Right TM is obscured by yellowish discharge, mild inflammation of canal     Assessment/Plan:     1. Bilateral impacted cerumen     2. Otalgia of both ears     3. Acute otitis externa of right ear, unspecified type  NEOMYCIN-POLYMYXIN-HC, OTIC, 1 % Solution     Avoid Q-tips. OTC analgesics for any associated pain.  Discontinue use of over-the-counter drops.  After cerumen removal, right otitis externa noted on exam.  The otic antibiotic drops sent to pharmacy.  Recommend patient follow-up with PCP in 1 to 2 weeks for reevaluation.  Patient reports hearing is normal after cerumen removal.    Differential diagnosis, natural history, supportive care, and indications for immediate follow-up discussed.    Advised the patient to follow-up with the primary care physician for recheck, reevaluation, and " consideration of further management.  Patient verbalized understanding of treatment plan and has no further questions regarding care.     I personally reviewed prior external notes and test results pertinent to today's visit.   Please note that this dictation was created using voice recognition software. I have made a reasonable attempt to correct obvious errors, but I expect that there are errors of grammar and possibly content that I did not discover before finalizing the note.    This note was electronically signed by Maryam Hamlin PA-C

## 2022-01-21 ENCOUNTER — OFFICE VISIT (OUTPATIENT)
Dept: URGENT CARE | Facility: PHYSICIAN GROUP | Age: 22
End: 2022-01-21
Payer: COMMERCIAL

## 2022-01-21 VITALS
BODY MASS INDEX: 22.66 KG/M2 | RESPIRATION RATE: 12 BRPM | OXYGEN SATURATION: 98 % | SYSTOLIC BLOOD PRESSURE: 118 MMHG | TEMPERATURE: 98.9 F | HEART RATE: 107 BPM | WEIGHT: 136 LBS | DIASTOLIC BLOOD PRESSURE: 64 MMHG | HEIGHT: 65 IN

## 2022-01-21 DIAGNOSIS — Z20.822 CLOSE EXPOSURE TO COVID-19 VIRUS: ICD-10-CM

## 2022-01-21 DIAGNOSIS — U07.1 COVID-19: ICD-10-CM

## 2022-01-21 LAB
EXTERNAL QUALITY CONTROL: NORMAL
SARS-COV+SARS-COV-2 AG RESP QL IA.RAPID: POSITIVE

## 2022-01-21 PROCEDURE — 87426 SARSCOV CORONAVIRUS AG IA: CPT | Performed by: NURSE PRACTITIONER

## 2022-01-21 PROCEDURE — 99213 OFFICE O/P EST LOW 20 MIN: CPT | Mod: CS | Performed by: NURSE PRACTITIONER

## 2022-01-21 ASSESSMENT — ENCOUNTER SYMPTOMS
HEADACHES: 1
NAUSEA: 0
COUGH: 1
SORE THROAT: 1
RHINORRHEA: 1
SHORTNESS OF BREATH: 0
WHEEZING: 0
HEMOPTYSIS: 0
CHILLS: 1
SPUTUM PRODUCTION: 0
DIARRHEA: 0
DIZZINESS: 1
VOMITING: 0

## 2022-01-21 NOTE — PROGRESS NOTES
Subjective:     Caterina Fall is a 21 y.o. female who presents for Illness (sorestarted Monday  throat, headache, soreness , fever, hot and cold flashes, cough)      Exposed to someone who had recovered from COVID. No hx of COVID. Vaccinated.     Cough  This is a new problem. The current episode started in the past 7 days. The problem has been unchanged. The cough is non-productive. Associated symptoms include chills, headaches, rhinorrhea and a sore throat. Pertinent negatives include no hemoptysis, shortness of breath or wheezing. She has tried nothing for the symptoms.       No past medical history on file.    No past surgical history on file.    Social History     Socioeconomic History   • Marital status:      Spouse name: Not on file   • Number of children: Not on file   • Years of education: Not on file   • Highest education level: Not on file   Occupational History   • Not on file   Tobacco Use   • Smoking status: Never Smoker   • Smokeless tobacco: Never Used   Vaping Use   • Vaping Use: Never used   Substance and Sexual Activity   • Alcohol use: Not Currently   • Drug use: Yes     Types: Marijuana   • Sexual activity: Yes   Other Topics Concern   • Not on file   Social History Narrative   • Not on file     Social Determinants of Health     Financial Resource Strain:    • Difficulty of Paying Living Expenses: Not on file   Food Insecurity:    • Worried About Running Out of Food in the Last Year: Not on file   • Ran Out of Food in the Last Year: Not on file   Transportation Needs:    • Lack of Transportation (Medical): Not on file   • Lack of Transportation (Non-Medical): Not on file   Physical Activity:    • Days of Exercise per Week: Not on file   • Minutes of Exercise per Session: Not on file   Stress:    • Feeling of Stress : Not on file   Social Connections:    • Frequency of Communication with Friends and Family: Not on file   • Frequency of Social Gatherings with Friends and Family: Not on file  "  • Attends Latter-day Services: Not on file   • Active Member of Clubs or Organizations: Not on file   • Attends Club or Organization Meetings: Not on file   • Marital Status: Not on file   Intimate Partner Violence:    • Fear of Current or Ex-Partner: Not on file   • Emotionally Abused: Not on file   • Physically Abused: Not on file   • Sexually Abused: Not on file   Housing Stability:    • Unable to Pay for Housing in the Last Year: Not on file   • Number of Places Lived in the Last Year: Not on file   • Unstable Housing in the Last Year: Not on file        Family History   Problem Relation Age of Onset   • Diabetes Maternal Grandmother         Allergies   Allergen Reactions   • Blueberry Flavor        Review of Systems   Constitutional: Positive for chills and malaise/fatigue.   HENT: Positive for rhinorrhea and sore throat. Negative for congestion.    Respiratory: Positive for cough. Negative for hemoptysis, sputum production, shortness of breath and wheezing.    Gastrointestinal: Negative for diarrhea, nausea and vomiting.   Neurological: Positive for dizziness and headaches.   All other systems reviewed and are negative.       Objective:   /64 (BP Location: Right arm, Patient Position: Sitting, BP Cuff Size: Adult)   Pulse (!) 107   Temp 37.2 °C (98.9 °F) (Temporal)   Resp 12   Ht 1.651 m (5' 5\")   Wt 61.7 kg (136 lb)   SpO2 98%   BMI 22.63 kg/m²     Physical Exam  Vitals reviewed.   Constitutional:       General: She is not in acute distress.     Appearance: She is well-developed. She is not toxic-appearing.   HENT:      Head: Normocephalic and atraumatic.      Right Ear: Tympanic membrane, ear canal and external ear normal.      Left Ear: Tympanic membrane, ear canal and external ear normal.      Nose: Mucosal edema present.      Mouth/Throat:      Lips: Pink.      Mouth: Mucous membranes are moist.      Pharynx: Uvula midline. Posterior oropharyngeal erythema present.      Tonsils: No tonsillar " exudate or tonsillar abscesses.      Comments: Clear post nasal drip.   Eyes:      Conjunctiva/sclera: Conjunctivae normal.   Cardiovascular:      Rate and Rhythm: Normal rate.   Pulmonary:      Effort: Pulmonary effort is normal. No respiratory distress.      Breath sounds: Normal breath sounds. No stridor. No wheezing, rhonchi or rales.   Musculoskeletal:         General: Normal range of motion.      Cervical back: Normal range of motion and neck supple.   Skin:     General: Skin is warm and dry.      Findings: No rash.   Neurological:      General: No focal deficit present.      Mental Status: She is alert and oriented to person, place, and time.      GCS: GCS eye subscore is 4. GCS verbal subscore is 5. GCS motor subscore is 6.   Psychiatric:         Mood and Affect: Mood normal.         Speech: Speech normal.         Behavior: Behavior normal.         Thought Content: Thought content normal.         Judgment: Judgment normal.         Assessment/Plan:   1. Viral URI with cough  - POCT SARS-COV Antigen NATE (Symptomatic Only)  - SARS-CoV-2 PCR (24 hour In-House): Collect NP swab in VTM; Future    2. Close exposure to COVID-19 virus  - POCT SARS-COV Antigen NATE (Symptomatic Only)  - SARS-CoV-2 PCR (24 hour In-House): Collect NP swab in VTM; Future    Results for orders placed or performed in visit on 01/21/22   POCT SARS-COV Antigen NATE (Symptomatic Only)   Result Value Ref Range    Internal  Valid     SARS-COV ANTIGEN NATE Positive    Symptomatic care.  -Oral hydration and rest.   -Cough control: nonpharmacologic options for cough relief such as throat lozenges, hot tea, honey.  -Over the counter expectorant as directed; Guaifenesin (Mucinex).  -Tylenol or ibuprofen for pain and fever as directed.   -Warm salt water gargles  -Cepacol for sore throat    Seek emergency medical care immediately for: Trouble breathing, persistent pain or pressure in the chest, confusion, inability to wake or stay awake,  bluish lips or face, persistent tachycardia (fast heart rate), prolonged dizziness, persistent high grade fevers. Follow up for prolonged cough, persistent wheezing, leg swelling, persistent throat pain, or any other concerns. Follow up with your Primary Care Provider.     -Discussed viral etiology. COVID S&S, and self isolation guidelines. S&S of PNA with follow up. Stable Vitals.    Differential diagnosis, natural history, supportive care, and indications for immediate follow-up discussed.

## 2022-01-21 NOTE — LETTER
January 21, 2022         Patient: Caterina Fall   YOB: 2000   Date of Visit: 1/21/2022     To Whom it May Concern:    Caterina Fall was seen in my clinic on 1/21/2022.     A concern for COVID-19 has been identified. The results will be available through our electronic delivery system called [employer].  We are asking employers to excuse absences while they follow self-isolation protocol per CDC guidelines.    • Stay home for 5 days.  •If you have no symptoms or your symptoms are resolving after 5 days, you can leave your house.  •Continue to wear a mask around others for 5 additional days.  •If you have a fever, continue to stay home until your fever resolves.     Please schedule a visit with a primary care provider if documents for FMLA, disability, or unemployment are required.      If you have any questions or concerns, please don't hesitate to call.        Sincerely,           JUAN Bryant.  Electronically Signed

## 2022-01-21 NOTE — PATIENT INSTRUCTIONS
Symptomatic care.  -Oral hydration and rest.   -Cough control: nonpharmacologic options for cough relief such as throat lozenges, hot tea, honey.  -Over the counter expectorant as directed; Guaifenesin (Mucinex).  -Tylenol or ibuprofen for pain and fever as directed.   -Warm salt water gargles  -Cepacol for sore throat    Seek emergency medical care immediately for: Trouble breathing, persistent pain or pressure in the chest, confusion, inability to wake or stay awake, bluish lips or face, persistent tachycardia (fast heart rate), prolonged dizziness, persistent high grade fevers. Follow up for prolonged cough, persistent wheezing, leg swelling, persistent throat pain, or any other concerns. Follow up with your Primary Care Provider.     Viral Respiratory Infection  A respiratory infection is an illness that affects part of the respiratory system, such as the lungs, nose, or throat. A respiratory infection that is caused by a virus is called a viral respiratory infection.  Common types of viral respiratory infections include:  · A cold.  · The flu (influenza).  · A respiratory syncytial virus (RSV) infection.  What are the causes?  This condition is caused by a virus.  What are the signs or symptoms?  Symptoms of this condition include:  · A stuffy or runny nose.  · Yellow or green nasal discharge.  · A cough.  · Sneezing.  · Fatigue.  · Achy muscles.  · A sore throat.  · Sweating or chills.  · A fever.  · A headache.  How is this diagnosed?  This condition may be diagnosed based on:  · Your symptoms.  · A physical exam.  · Testing of nasal swabs.  How is this treated?  This condition may be treated with medicines, such as:  · Antiviral medicine. This may shorten the length of time a person has symptoms.  · Expectorants. These make it easier to cough up mucus.  · Decongestant nasal sprays.  · Acetaminophen or NSAIDs to relieve fever and pain.  Antibiotic medicines are not prescribed for viral infections. This is  because antibiotics are designed to kill bacteria. They are not effective against viruses.  Follow these instructions at home:    Managing pain and congestion  · Take over-the-counter and prescription medicines only as told by your health care provider.  · If you have a sore throat, gargle with a salt-water mixture 3-4 times a day or as needed. To make a salt-water mixture, completely dissolve ½-1 tsp of salt in 1 cup of warm water.  · Use nose drops made from salt water to ease congestion and soften raw skin around your nose.  · Drink enough fluid to keep your urine pale yellow. This helps prevent dehydration and helps loosen up mucus.  General instructions  · Rest as much as possible.  · Do not drink alcohol.  · Do not use any products that contain nicotine or tobacco, such as cigarettes and e-cigarettes. If you need help quitting, ask your health care provider.  · Keep all follow-up visits as told by your health care provider. This is important.  How is this prevented?    · Get an annual flu shot. You may get the flu shot in late summer, fall, or winter. Ask your health care provider when you should get your flu shot.  · Avoid exposing others to your respiratory infection.  ? Stay home from work or school as told by your health care provider.  ? Wash your hands with soap and water often, especially after you cough or sneeze. If soap and water are not available, use alcohol-based hand .  · Avoid contact with people who are sick during cold and flu season. This is generally fall and winter.  Contact a health care provider if:  · Your symptoms last for 10 days or longer.  · Your symptoms get worse over time.  · You have a fever.  · You have severe sinus pain in your face or forehead.  · The glands in your jaw or neck become very swollen.  Get help right away if you:  · Feel pain or pressure in your chest.  · Have shortness of breath.  · Faint or feel like you will faint.  · Have severe and persistent  vomiting.  · Feel confused or disoriented.  Summary  · A respiratory infection is an illness that affects part of the respiratory system, such as the lungs, nose, or throat. A respiratory infection that is caused by a virus is called a viral respiratory infection.  · Common types of viral respiratory infections are a cold, influenza, and respiratory syncytial virus (RSV) infection.  · Symptoms of this condition include a stuffy or runny nose, cough, sneezing, fatigue, achy muscles, sore throat, and fevers or chills.  · Antibiotic medicines are not prescribed for viral infections. This is because antibiotics are designed to kill bacteria. They are not effective against viruses.  This information is not intended to replace advice given to you by your health care provider. Make sure you discuss any questions you have with your health care provider.  Document Released: 09/27/2006 Document Revised: 12/26/2019 Document Reviewed: 01/28/2019  [employer] Patient Education © 2020 [employer]      COVID-19  COVID-19 is a respiratory infection that is caused by a virus called severe acute respiratory syndrome coronavirus 2 (SARS-CoV-2). The disease is also known as coronavirus disease or novel coronavirus. In some people, the virus may not cause any symptoms. In others, it may cause a serious infection. The infection can get worse quickly and can lead to complications, such as:  · Pneumonia, or infection of the lungs.  · Acute respiratory distress syndrome or ARDS. This is fluid build-up in the lungs.  · Acute respiratory failure. This is a condition in which there is not enough oxygen passing from the lungs to the body.  · Sepsis or septic shock. This is a serious bodily reaction to an infection.  · Blood clotting problems.  · Secondary infections due to bacteria or fungus.  The virus that causes COVID-19 is contagious. This means that it can spread from person to person through droplets from coughs and sneezes (respiratory  secretions).  What are the causes?  This illness is caused by a virus. You may catch the virus by:  · Breathing in droplets from an infected person's cough or sneeze.  · Touching something, like a table or a doorknob, that was exposed to the virus (contaminated) and then touching your mouth, nose, or eyes.  What increases the risk?  Risk for infection  You are more likely to be infected with this virus if you:  · Live in or travel to an area with a COVID-19 outbreak.  · Come in contact with a sick person who recently traveled to an area with a COVID-19 outbreak.  · Provide care for or live with a person who is infected with COVID-19.  Risk for serious illness  You are more likely to become seriously ill from the virus if you:  · Are 65 years of age or older.  · Have a long-term disease that lowers your body's ability to fight infection (immunocompromised).  · Live in a nursing home or long-term care facility.  · Have a long-term (chronic) disease such as:  ? Chronic lung disease, including chronic obstructive pulmonary disease or asthma  ? Heart disease.  ? Diabetes.  ? Chronic kidney disease.  ? Liver disease.  · Are obese.  What are the signs or symptoms?  Symptoms of this condition can range from mild to severe. Symptoms may appear any time from 2 to 14 days after being exposed to the virus. They include:  · A fever.  · A cough.  · Difficulty breathing.  · Chills.  · Muscle pains.  · A sore throat.  · Loss of taste or smell.  Some people may also have stomach problems, such as nausea, vomiting, or diarrhea.  Other people may not have any symptoms of COVID-19.  How is this diagnosed?  This condition may be diagnosed based on:  · Your signs and symptoms, especially if:  ? You live in an area with a COVID-19 outbreak.  ? You recently traveled to or from an area where the virus is common.  ? You provide care for or live with a person who was diagnosed with COVID-19.  · A physical exam.  · Lab tests, which may  include:  ? A nasal swab to take a sample of fluid from your nose.  ? A throat swab to take a sample of fluid from your throat.  ? A sample of mucus from your lungs (sputum).  ? Blood tests.  · Imaging tests, which may include, X-rays, CT scan, or ultrasound.  How is this treated?  At present, there is no medicine to treat COVID-19. Medicines that treat other diseases are being used on a trial basis to see if they are effective against COVID-19.  Your health care provider will talk with you about ways to treat your symptoms. For most people, the infection is mild and can be managed at home with rest, fluids, and over-the-counter medicines.  Treatment for a serious infection usually takes places in a hospital intensive care unit (ICU). It may include one or more of the following treatments. These treatments are given until your symptoms improve.  · Receiving fluids and medicines through an IV.  · Supplemental oxygen. Extra oxygen is given through a tube in the nose, a face mask, or a camacho.  · Positioning you to lie on your stomach (prone position). This makes it easier for oxygen to get into the lungs.  · Continuous positive airway pressure (CPAP) or bi-level positive airway pressure (BPAP) machine. This treatment uses mild air pressure to keep the airways open. A tube that is connected to a motor delivers oxygen to the body.  · Ventilator. This treatment moves air into and out of the lungs by using a tube that is placed in your windpipe.  · Tracheostomy. This is a procedure to create a hole in the neck so that a breathing tube can be inserted.  · Extracorporeal membrane oxygenation (ECMO). This procedure gives the lungs a chance to recover by taking over the functions of the heart and lungs. It supplies oxygen to the body and removes carbon dioxide.  Follow these instructions at home:  Lifestyle  · If you are sick, stay home except to get medical care. Your health care provider will tell you how long to stay home.  Call your health care provider before you go for medical care.  · Rest at home as told by your health care provider.  · Do not use any products that contain nicotine or tobacco, such as cigarettes, e-cigarettes, and chewing tobacco. If you need help quitting, ask your health care provider.  · Return to your normal activities as told by your health care provider. Ask your health care provider what activities are safe for you.  General instructions  · Take over-the-counter and prescription medicines only as told by your health care provider.  · Drink enough fluid to keep your urine pale yellow.  · Keep all follow-up visits as told by your health care provider. This is important.  How is this prevented?    There is no vaccine to help prevent COVID-19 infection. However, there are steps you can take to protect yourself and others from this virus.  To protect yourself:   · Do not travel to areas where COVID-19 is a risk. The areas where COVID-19 is reported change often. To identify high-risk areas and travel restrictions, check the CDC travel website: wwwnc.cdc.gov/travel/notices  · If you live in, or must travel to, an area where COVID-19 is a risk, take precautions to avoid infection.  ? Stay away from people who are sick.  ? Wash your hands often with soap and water for 20 seconds. If soap and water are not available, use an alcohol-based hand .  ? Avoid touching your mouth, face, eyes, or nose.  ? Avoid going out in public, follow guidance from your state and local health authorities.  ? If you must go out in public, wear a cloth face covering or face mask.  ? Disinfect objects and surfaces that are frequently touched every day. This may include:  § Counters and tables.  § Doorknobs and light switches.  § Sinks and faucets.  § Electronics, such as phones, remote controls, keyboards, computers, and tablets.  To protect others:  If you have symptoms of COVID-19, take steps to prevent the virus from spreading  to others.  · If you think you have a COVID-19 infection, contact your health care provider right away. Tell your health care team that you think you may have a COVID-19 infection.  · Stay home. Leave your house only to seek medical care. Do not use public transport.  · Do not travel while you are sick.  · Wash your hands often with soap and water for 20 seconds. If soap and water are not available, use alcohol-based hand .  · Stay away from other members of your household. Let healthy household members care for children and pets, if possible. If you have to care for children or pets, wash your hands often and wear a mask. If possible, stay in your own room, separate from others. Use a different bathroom.  · Make sure that all people in your household wash their hands well and often.  · Cough or sneeze into a tissue or your sleeve or elbow. Do not cough or sneeze into your hand or into the air.  · Wear a cloth face covering or face mask.  Where to find more information  · Centers for Disease Control and Prevention: www.cdc.gov/coronavirus/2019-ncov/index.html  · World Health Organization: www.who.int/health-topics/coronavirus  Contact a health care provider if:  · You live in or have traveled to an area where COVID-19 is a risk and you have symptoms of the infection.  · You have had contact with someone who has COVID-19 and you have symptoms of the infection.  Get help right away if:  · You have trouble breathing.  · You have pain or pressure in your chest.  · You have confusion.  · You have bluish lips and fingernails.  · You have difficulty waking from sleep.  · You have symptoms that get worse.  These symptoms may represent a serious problem that is an emergency. Do not wait to see if the symptoms will go away. Get medical help right away. Call your local emergency services (911 in the [place] Do not drive yourself to the hospital. Let the emergency medical personnel know if you think you have  COVID-19.  Summary  · COVID-19 is a respiratory infection that is caused by a virus. It is also known as coronavirus disease or novel coronavirus. It can cause serious infections, such as pneumonia, acute respiratory distress syndrome, acute respiratory failure, or sepsis.  · The virus that causes COVID-19 is contagious. This means that it can spread from person to person through droplets from coughs and sneezes.  · You are more likely to develop a serious illness if you are 65 years of age or older, have a weak immunity, live in a nursing home, or have chronic disease.  · There is no medicine to treat COVID-19. Your health care provider will talk with you about ways to treat your symptoms.  · Take steps to protect yourself and others from infection. Wash your hands often and disinfect objects and surfaces that are frequently touched every day. Stay away from people who are sick and wear a mask if you are sick.  This information is not intended to replace advice given to you by your health care provider. Make sure you discuss any questions you have with your health care provider.  Document Released: 01/23/2020 Document Revised: 05/14/2020 Document Reviewed: 01/23/2020  [employer] Patient Education © 2020 [employer]

## 2023-07-23 ENCOUNTER — OFFICE VISIT (OUTPATIENT)
Dept: URGENT CARE | Facility: PHYSICIAN GROUP | Age: 23
End: 2023-07-23
Payer: COMMERCIAL

## 2023-07-23 VITALS
SYSTOLIC BLOOD PRESSURE: 110 MMHG | WEIGHT: 140 LBS | HEART RATE: 84 BPM | TEMPERATURE: 98.2 F | BODY MASS INDEX: 23.32 KG/M2 | OXYGEN SATURATION: 97 % | DIASTOLIC BLOOD PRESSURE: 62 MMHG | HEIGHT: 65 IN | RESPIRATION RATE: 16 BRPM

## 2023-07-23 DIAGNOSIS — S93.402A MODERATE LEFT ANKLE SPRAIN, INITIAL ENCOUNTER: ICD-10-CM

## 2023-07-23 PROCEDURE — 3078F DIAST BP <80 MM HG: CPT | Performed by: NURSE PRACTITIONER

## 2023-07-23 PROCEDURE — 99213 OFFICE O/P EST LOW 20 MIN: CPT | Performed by: NURSE PRACTITIONER

## 2023-07-23 PROCEDURE — 3074F SYST BP LT 130 MM HG: CPT | Performed by: NURSE PRACTITIONER

## 2023-07-23 NOTE — PROGRESS NOTES
Patient has consented to treatment and for use of patient information for treatment and billing purposes.    Date: 07/23/23     Arrival Mode: Private Vehicle    Chief Complaint:    Chief Complaint   Patient presents with    Ankle Injury     L ankle pain, difficulty applying pressure, sensitive, onset last night        History of Present Illness: 22 y.o.  female presents to clinic with left ankle injury that happened last night.  Patient was seen and evaluated at [place] emergency room.  Patient states she did obtain x-rays of her ankle as well as her foot fortunately there were no fractures noted.  She was given crutches as well as a ankle brace.  She presents to clinic today as she did not obtain a work note that she needed while in the ER.  She does have a mild increase in pain this morning.  She has been using the brace and the crutches.  She has also been using ice.  They did provide narcotic like pain medication for pain.  She denies any distal numbness or tingling.  She presents to clinic for education in regards to her injury as well as a work note.      ROS:    As stated in HPI     Pertinent Medical History:  History reviewed. No pertinent past medical history.     Pertinent Surgical History:  History reviewed. No pertinent surgical history.     Pertinent Medications:    No current outpatient medications on file prior to visit.     No current facility-administered medications on file prior to visit.        Allergies:    Blueberry flavor     Social History:  Social History     Tobacco Use    Smoking status: Never    Smokeless tobacco: Never   Vaping Use    Vaping Use: Never used   Substance Use Topics    Alcohol use: Not Currently    Drug use: Yes     Types: Marijuana        No LMP recorded.           Physical Exam:    Vitals:    07/23/23 1058   BP: 110/62   Pulse: 84   Resp: 16   Temp: 36.8 °C (98.2 °F)   SpO2: 97%             Physical Exam  Constitutional:       Appearance: Normal appearance.   HENT:       Head: Normocephalic and atraumatic.   Musculoskeletal:      Left ankle: Swelling and ecchymosis present. Tenderness present over the lateral malleolus. Decreased range of motion. Normal pulse.      Left Achilles Tendon: Normal.      Left foot: Normal capillary refill. Normal pulse.      Comments: Swelling ecchymosis and tenderness to the lateral malleoli with decreased range of motion.  Distal neurovascular status is grossly intact.   Neurological:      Mental Status: She is alert.          Diagnostics:    None   Medical Decision making and clinic course :  I personally reviewed prior external notes and test results pertinent to today's visit.  Patient did present to clinic with paperwork from ER visit did review this.  Pt is clinically stable at today's acute urgent care visit.  No acute distress noted. Appropriate for outpatient care at this time. Shared decision-making was utilized with patient for treatment plan.    Pleasant nontoxic-appearing 22-year-old female presenting clinic with 1 day history of left ankle injury.  HPI and exam findings are consistent with a moderate ankle sprain.  Fortunately patient does have a follow-up with orthopedic in 3 days time.  Advised to keep that follow-up.  Patient is placed in a left lace up ankle brace in clinic that was in good alignment with distal neurovascular status intact on discharge.  Advised to continue crutches.  Did provide a work note for reasonable workplace accommodations.  Did educate on ankle brace as well as ankle sprain.  Patient did verbalize understanding agree with plan.      The patient remained stable during the urgent care visit.    Plan:    Medication discussed included indication for use and the potential  benefits and side effects.         1. Moderate left ankle sprain, initial encounter           Printed education was provided regarding the aforementioned assessments.  All of the patient's questions were answered to their satisfaction at the  time of discharge.    Follow up:  Keep scheduled follow-up with Ortho in 3 days time    Patient was encouraged to monitor symptoms closely. Those signs and symptoms which would warrant concern and mandate seeking a higher level of service through the emergency department discussed at length and included in discharge papers.  Patient stated agreement and understanding of this plan of care.    Disposition:  Home in stable condition       Voice Recognition Disclaimer:  Portions of this document were created using voice recognition software. The software does have a chance of producing errors of grammar and possibly content. I have made every reasonable attempt to correct obvious errors, but there may be errors of grammar and possibly content that I did not discover before finalizing the documentation.    Fabiola Vides, [email or web address].

## 2023-07-23 NOTE — LETTER
July 23, 2023    To Whom It May Concern:         This is confirmation that Caterina Fall attended her scheduled appointment with LUCIEN Parker on 7/23/23. Please make reasonable work accommodations due to moderate ankle sprain.  Recommend desk or sedentary work. Must use crutches and not put weight on left ankle until cleared by orthopedic. Please excuse from work on  7/26/23 due to orthopedic appointment.          Sincerely,          EDWIN ParkerRMaria ElenaN.  [phone]

## 2024-04-12 ENCOUNTER — OFFICE VISIT (OUTPATIENT)
Dept: URGENT CARE | Facility: CLINIC | Age: 24
End: 2024-04-12
Payer: COMMERCIAL

## 2024-04-12 VITALS
BODY MASS INDEX: 25.71 KG/M2 | RESPIRATION RATE: 18 BRPM | SYSTOLIC BLOOD PRESSURE: 122 MMHG | TEMPERATURE: 100.1 F | DIASTOLIC BLOOD PRESSURE: 84 MMHG | HEIGHT: 66 IN | HEART RATE: 105 BPM | OXYGEN SATURATION: 95 % | WEIGHT: 160 LBS

## 2024-04-12 DIAGNOSIS — J10.1 INFLUENZA A: ICD-10-CM

## 2024-04-12 DIAGNOSIS — R68.89 FLU-LIKE SYMPTOMS: ICD-10-CM

## 2024-04-12 LAB
FLUAV RNA SPEC QL NAA+PROBE: POSITIVE
FLUBV RNA SPEC QL NAA+PROBE: NEGATIVE
RSV RNA SPEC QL NAA+PROBE: NEGATIVE
SARS-COV-2 RNA RESP QL NAA+PROBE: NEGATIVE

## 2024-04-12 PROCEDURE — 3074F SYST BP LT 130 MM HG: CPT | Performed by: FAMILY MEDICINE

## 2024-04-12 PROCEDURE — 3079F DIAST BP 80-89 MM HG: CPT | Performed by: FAMILY MEDICINE

## 2024-04-12 PROCEDURE — 0241U POCT CEPHEID COV-2, FLU A/B, RSV - PCR: CPT | Performed by: FAMILY MEDICINE

## 2024-04-12 PROCEDURE — 99213 OFFICE O/P EST LOW 20 MIN: CPT | Performed by: FAMILY MEDICINE

## 2024-04-12 RX ORDER — OSELTAMIVIR PHOSPHATE 75 MG/1
75 CAPSULE ORAL 2 TIMES DAILY
Qty: 10 CAPSULE | Refills: 0 | Status: SHIPPED | OUTPATIENT
Start: 2024-04-12 | End: 2024-04-19

## 2024-04-12 ASSESSMENT — ENCOUNTER SYMPTOMS
GASTROINTESTINAL NEGATIVE: 1
EYES NEGATIVE: 1
FEVER: 1
SORE THROAT: 1
COUGH: 1
HEADACHES: 1
CARDIOVASCULAR NEGATIVE: 1

## 2024-04-12 NOTE — LETTER
[facility]  [facility]  [place] [address]     April 12, 2024    Patient: Caterina Fall   YOB: 2000   Date of Visit: 4/12/2024       To Whom It May Concern:    Caterina Fall was seen and treated in our department on 4/12/2024. Please excuse for flu like symptoms.    Sincerely,     Abraham Watts M.D.

## 2024-04-12 NOTE — PROGRESS NOTES
"Subjective:   Caterina Fall is a 23 y.o. female who presents for Fever (X2 days, body ache, muscle ache, vomiting, and dry cough )      Fever   Associated symptoms include congestion, coughing, headaches and a sore throat.       Review of Systems   Constitutional:  Positive for fever and malaise/fatigue.   HENT:  Positive for congestion and sore throat.    Eyes: Negative.    Respiratory:  Positive for cough.    Cardiovascular: Negative.    Gastrointestinal: Negative.    Genitourinary: Negative.    Skin: Negative.    Neurological:  Positive for headaches.       Medications, Allergies, and current problem list reviewed today in Epic.     Objective:     /84   Pulse (!) 105   Temp 37.8 °C (100.1 °F) (Temporal)   Resp 18   Ht 1.676 m (5' 6\")   Wt 72.6 kg (160 lb)   SpO2 95%     Physical Exam  Vitals and nursing note reviewed.   Constitutional:       Appearance: She is ill-appearing.   HENT:      Head: Normocephalic and atraumatic.      Right Ear: Tympanic membrane normal.      Left Ear: Tympanic membrane normal.      Nose: Congestion present.      Mouth/Throat:      Pharynx: Oropharynx is clear.   Cardiovascular:      Rate and Rhythm: Regular rhythm. Tachycardia present.      Pulses: Normal pulses.      Heart sounds: Normal heart sounds.   Pulmonary:      Effort: Pulmonary effort is normal.      Breath sounds: Normal breath sounds.   Abdominal:      General: Abdomen is flat. Bowel sounds are normal.      Palpations: Abdomen is soft.   Musculoskeletal:      Cervical back: No tenderness.   Lymphadenopathy:      Cervical: No cervical adenopathy.   Neurological:      Mental Status: She is alert.         Assessment/Plan:     Diagnosis and associated orders:     1. Flu-like symptoms  POCT CEPHEID COV-2, FLU A/B, RSV - PCR      2. Influenza A  oseltamivir (TAMIFLU) 75 MG Cap         Comments/MDM:     Note for work         Differential diagnosis, natural history, supportive care, and indications for immediate " follow-up discussed.    Advised the patient to follow-up with the primary care physician for recheck, reevaluation, and consideration of further management.    Please note that this dictation was created using voice recognition software. I have made a reasonable attempt to correct obvious errors, but I expect that there are errors of grammar and possibly content that I did not discover before finalizing the note.    This note was electronically signed by Abraham Watts M.D.

## 2024-04-19 ENCOUNTER — OCCUPATIONAL MEDICINE (OUTPATIENT)
Dept: URGENT CARE | Facility: CLINIC | Age: 24
End: 2024-04-19
Payer: COMMERCIAL

## 2024-04-19 VITALS
SYSTOLIC BLOOD PRESSURE: 118 MMHG | WEIGHT: 148.37 LBS | DIASTOLIC BLOOD PRESSURE: 72 MMHG | HEART RATE: 73 BPM | RESPIRATION RATE: 14 BRPM | OXYGEN SATURATION: 98 % | HEIGHT: 66 IN | TEMPERATURE: 98 F | BODY MASS INDEX: 23.84 KG/M2

## 2024-04-19 DIAGNOSIS — S80.811A ABRASION, RIGHT LOWER LEG, INITIAL ENCOUNTER: ICD-10-CM

## 2024-04-19 PROCEDURE — 3074F SYST BP LT 130 MM HG: CPT | Performed by: NURSE PRACTITIONER

## 2024-04-19 PROCEDURE — 99213 OFFICE O/P EST LOW 20 MIN: CPT | Performed by: NURSE PRACTITIONER

## 2024-04-19 PROCEDURE — 3078F DIAST BP <80 MM HG: CPT | Performed by: NURSE PRACTITIONER

## 2024-04-19 ASSESSMENT — ENCOUNTER SYMPTOMS
FEVER: 0
CHILLS: 0

## 2024-04-19 NOTE — PROGRESS NOTES
"Subjective     Caterina Fall is a 23 y.o. female who presents with Leg Injury (Got scratched with a nail from a pallet)      DOI 4/19/24; 1st visit.  Caterina was at work today. She was moving a pallet with a co-worker and as the lifted it, a nail that was sticking out scraped the lateral aspect of her right lower leg.  She sustained a superficial abrasion with no penetrating wound or active bleeding.  She cleaned the wound and applied antimicrobial wound gel.  She denies any pain.  She is not up to date on tetanus vaccine.       HPI    Review of Systems   Constitutional:  Negative for chills, fever and malaise/fatigue.     Medications, Allergies, and current problem list reviewed today in Epic         Objective     Blood Pressure 118/72   Pulse 73   Temperature 36.7 °C (98 °F) (Temporal)   Respiration 14   Height 1.676 m (5' 6\")   Weight 67.3 kg (148 lb 5.9 oz)   Oxygen Saturation 98%   Body Mass Index 23.95 kg/m²      Physical Exam    Anal is alert, oriented, and in no acute distress.  Vitals stable. Afebrile.  Minor wound of the lateral aspect of the right lower leg: approximately 1 mm wide and 5 cm long superficial abrasion.  No bleeding.  No penetrating or open wound.  No TTP.  NO bruising, swelling, erythema, or rash.  No gait abnormalities.                      Assessment & Plan        1. Abrasion, right lower leg, initial encounter    Discussed exam findings with Caterina.  This appears to be a self-limited superficial wound.  OTC analgesics such as tylenol or ibuprofen as needed for pain.  May use a cold compress today if desired for pain relief.  She politely declined tetanus vaccine in clinic but as she is due for routine booster, she will consider getting this updated at her convenience outside of this case.  No further medical management indicated.  RTC if any evidence of secondary complication or infection develop.  Discharged MMI.                  "

## 2024-04-19 NOTE — LETTER
"    EMPLOYEE’S CLAIM FOR COMPENSATION/ REPORT OF INITIAL TREATMENT  FORM C-4  PLEASE TYPE OR PRINT    EMPLOYEE’S CLAIM - PROVIDE ALL INFORMATION REQUESTED   First Name                    MARLYS Diggs Last Name  Juan David Birthdate                    2000                Sex  []M  []F Claim Number (Insurer’s Use Only)     Home Address  [address] Age  23 y.o. Height  1.676 m (5' 6\") Weight  67.3 kg (148 lb 5.9 oz) Social Security Number     [facility] Zip  [address] Telephone  [phone] (work)   Mailing Address  [address] [facility] Zip  [address] Primary Language Spoken  English    INSURER  Xavi Vance THIRD-PARTY   Xavi Vance   Employee's Occupation (Job Title) When Injury or Occupational Disease Occurred       Employer's Name/Company Name    [employer] Telephone  [phone]    Office Mail Address (Number and Street)  [address] [place] [address]     Date of Injury (if applicable) 4/19/2024               Hours Injury (if applicable)  8:20 AM Date Employer Notified  4/19/2024 Last Day of Work after Injury or Occupational Disease  4/19/2024 Supervisor to Whom Injury     Reported  Alpesh Vásquez   Address or Location of Accident (if applicable)  Work [1]   What were you doing at the time of accident? (if applicable)  Team lifting a pallet    How did this injury or occupational disease occur? (Be specific and answer in detail. Use additional sheet if necessary)  Putting iteams away from a pallet, when finished myself with another coworker lifted the pallet to the deck. One of the nails scrapped me right shin/calf   If you believe that you have an occupational disease, when did you first have knowledge of the disability and its relationship to your employment?  n/a Witnesses to the Accident (if applicable)  Alpesh Richards      Nature of Injury or " Occupational Disease  Workers' Compensation  Part(s) of Body Injured or Affected  Lower Leg (R) N/A N/A    I CERTIFY THAT THE ABOVE IS TRUE AND CORRECT TO T HE BEST OF MY KNOWLEDGE AND THAT I HAVE PROVIDED THIS INFORMATION IN ORDER TO OBTAIN THE BENEFITS OF [place], OR [place]  I HEREBY AUTHORIZE ANY PHYSICIAN, CHIROPRACTOR, SURGEON, PRACTITIONER OR ANY OTHER PERSON, ANY HOSPITAL, INCLUDING [facility] OR [facility], ANY  MEDICAL SERVICE ORGANIZATION, ANY INSURANCE COMPANY, OR OTHER INSTITUTION OR ORGANIZATION TO RELEASE TO EACH OTHER, ANY MEDICAL OR OTHER INFORMATION, INCLUDING BENEFITS PAID OR PAYABLE, PERTINENT TO THIS INJURY OR DISEASE, EXCEPT INFORMATION RELATIVE TO DIAGNOSIS, TREATMENT AND/OR COUNSELING FOR AIDS, PSYCHOLOGICAL CONDITIONS, ALCOHOL OR CONTROLLED SUBSTANCES, FOR WHICH I MUST GIVE SPECIFIC AUTHORIZATION.  A PHOTOSTAT OF THIS AUTHORIZATION SHALL BE VALID AS THE ORIGINAL.     Date 4/19/24   Place [facility] Employee’s Original or  *Electronic Signature   THIS REPORT MUST BE COMPLETED AND MAILED WITHIN 3 WORKING DAYS OF TREATMENT   Place  [facility]    Name of Facility  [facility]   Date 4/19/2024 Diagnosis and Description of Injury or Occupational Disease  (S80.811A) Abrasion, right lower leg, initial encounter  The encounter diagnosis was Abrasion, right lower leg, initial encounter. Is there evidence that the injured employee was under the influence of alcohol and/or another controlled substance at the time of accident?  []No  [] Yes (if yes, please explain)   Hour 12:12 PM  No   Treatment: OTC analgesic and cold compress prn pain.    Have you advised the patient to remain off work five days or more?   [] Yes Indicate dates: From   To    []No If no, is the injured employee capable of: [] full duty [] modified duty                                                              Yes     If modified duty, specify any limitations / restrictions:                                                                                                                                                                                                                                                                                                                                                                                                                  X-Ray Findings:      From information given by the employee, together with medical evidence, can you directly connect this injury or occupational disease as job incurred?  []Yes   [] No Yes    Is additional medical care by a physician indicated? []Yes [] No  No    Do you know of any previous injury or disease contributing to this condition or occupational disease? []Yes [] No (Explain if yes)                          No   Date  4/19/2024 Print Health Care Provider’s Name  LUCIEN Cavazos I certify that the employer’s copy of  this form was delivered to the employer on:   Address  [address] INSURER'S USE ONLY                       [facility] Zip  [address] Provider’s Tax ID Number  [id number]   Telephone  Dept: [phone]    Health Care Provider’s Original or Electronic Signature  e-TIERNEY Loredo A.P.R.GLADIS Degree (MD,DO, DC,PA-C,APRN)  APRN  Choose (if applicable)      ORIGINAL - TREATING HEALTHCARE PROVIDER PAGE 2 - INSURER/TPA PAGE 3 - EMPLOYER PAGE 4 - EMPLOYEE             Form C-4 (rev.08/23)     BRIEF DESCRIPTION OF RIGHTS AND BENEFITS  (Pursuant to [place])    Notice of Injury or Occupational Disease (Incident Report Form C-1): If an injury or occupational disease (OD) arises out of and in the  course of employment, you must provide written notice to your employer as soon as practicable, but no later than 7 days after the accident or  OD. Your employer shall maintain a sufficient supply  of the required forms.    Employee’s Claim for Compensation/Report of Initial Treatment (Form C-4): If medical treatment is sought, the Form C-4 is available at  the place of initial treatment. A completed Form C-4 must be filed within 90 days after an accident or OD. The treating physician, chiropractic  physician, physician assistant or advanced practice nurse must, within 3 working days after treatment, complete and mail to the employer, the  employer's insurer and third-party , the Claim for Compensation.    Medical Treatment: If you require medical treatment for your on-the-job injury or OD, you may be required to select a physician or  chiropractic physician from a list provided by your workers’ compensation insurer, if it has contracted with an Organization for Managed Care  (MCO) or Preferred Provider Organization (PPO) or providers of health care. If your employer has not entered into a contract with an MCO or  PPO, you may select a physician or chiropractic physician from the Panel of Physicians and Chiropractic Physicians. Any medical costs related  to your industrial injury or OD will be paid by your insurer.    Temporary Total Disability (TTD): If your doctor has certified that you are unable to work for a period of at least 5 consecutive days, or 5  cumulative days in a 20-day period, or places restrictions on you that your employer does not accommodate, you may be entitled to TTD  compensation.    Temporary Partial Disability (TPD): If the wage you receive upon reemployment is less than the compensation for TTD to which you are  entitled, the insurer may be required to pay you TPD compensation to make up the difference. TPD can only be paid for a maximum of 24  months.    Permanent Partial Disability (PPD): When your medical condition is stable and there is an indication of a PPD as a result of your injury or  OD, within 30 days, your insurer must arrange for an evaluation by a rating  physician or chiropractic physician to determine the degree of your  PPD. The amount of your PPD award depends on the date of injury, the results of the PPD evaluation, your age and wage.    Permanent Total Disability (PTD): If you are medically certified by a treating physician or chiropractic physician as permanently and totally  disabled and have been granted a PTD status by your insurer, you are entitled to receive monthly benefits not to exceed 66 2/3% of your  average monthly wage. The amount of your PTD payments is subject to reduction if you previously received a lump-sum PPD award.    Vocational Rehabilitation Services: You may be eligible for vocational rehabilitation services if you are unable to return to the job due to a  permanent physical impairment or permanent restrictions as a result of your injury or occupational disease.    Transportation and Per Jarek Reimbursement: You may be eligible for travel expenses and per jarek associated with medical treatment.    Reopening: You may be able to reopen your claim if your condition worsens after claim closure.    Appeal Process: If you disagree with a written determination issued by the insurer or the insurer does not respond to your request, you may  appeal to the Department of Administration, , by following the instructions contained in your determination letter. You must  appeal the determination within 70 days from the date of the determination letter at [address], [address], [place], [place]  [address], or [address] [facility] [address], [place] [address]. If you disagree with the  decision, you may appeal to the  Department of Administration, . You must file your appeal within 30 days from the date of the  decision letter  at [address], [address], [place], [place] [address], or [address], [place] [address]. If you  disagree with a  decision of an , you may file a petition for judicial review with the District Court. You must do so within 30  days of the ’s decision. You may be represented by an  at your own expense, or you may contact the [facility] for possible  Representation.    [place]  for Injured Workers (NAIW): If you disagree with a  decision, you may request that NAIW represent you  without charge at an  Hearing. For information regarding denial of benefits, you may contact the [facility] at: [address], [address], [place] [address], [phone], or [address], [address], [place] [address], [phone]    To File a Complaint with the Division: If you wish to file a complaint with the  of the Division of Industrial Relations (DIR),  please contact the Workers’ Compensation Section, [address] [place], [place] [address]y. [address], [place] [address], telephone [phone], or  [address], [place], [place] [address], telephone [phone].    For AssistancewithWorkers’Compensation Issues: You may contact the [facility] Office for Consumer Health Assistance, [address] [place] [address], Toll Free 1-789.897.9248, Web site:  https://adsd.nv.[facility]/Programs/JEFFREY/Office_for_Consumer_Health_Assistance_([facility])/ E-mail: jeffrey@govcha.nv.[facility]              __________________________________________________________________                                    ___4/19/24______________            Employee Name / Signature                                                                                                                            Date                                                                                                                                                                                                                              D-2 (rev. 02/24)

## 2024-04-19 NOTE — LETTER
PHYSICIAN’S AND CHIROPRACTIC PHYSICIAN'S                       PROGRESS REPORT  CERTIFICATION OF DISABILITY Claim Number:        Social Security Number:     Patient’s Name:Caterina Fall Date of Injury:  4/19/2024     Employer:     Name of O (if applicable)   Patient’s Job Description/Occupation:       Previous Injuries/Diseases/Surgeries Contributing to the Condition:   none   Diagnosis:  (S80.011A) Abrasion, right lower leg, initial encounterThe encounter diagnosis was Abrasion, right lower leg, initial encounter.   Related to the Industrial Injury? Yes   Explain:[unfilled]   Objective Medical Findings: Anal is alert, oriented, and in no acute distress.  Vitals stable. Afebrile.  Minor wound of the lateral aspect of the right lower leg: approximately 1 mm wide and 5 cm long superficial abrasion.  No bleeding.  No penetrating or open wound.  No TTP.  NO bruising, swelling, erythema, or rash.  No gait abnormalities.       [x]  None - Discharged                         Stable     [x]  Yes     []  No                           Ratable     []  Yes     []  No   []  Generally Improved                        []  Condition Worsened                              []  Condition Same         May Have Suffered a Permanent Disability     []  Yes     [x]  No   Treatment Plan: [unfilled]     [] No Change in Therapy                    [] PT/OT Prescribed                   [] Medication May be Used While Working    [] Case Management                          [] PT/OT Discontinued  []  Consultation    [] Further Diagnostic Studies    []  Prescription(s)                        [x] Released to FULL DUTY /No Restrictions on (Date):                                                                                           [] Certified TOTALLY TEMPORARILY DISABLED (Indicate Dates): From:                       To:                               [] Released  to RESTRICTED/Modified Duty on (Date): From:                              To:                                                                   Restrictions Are:     []  Permanent[]  Temporary   [] No Sitting                   []  No Standing          []  No Pulling             []  Other:                                              [] No Bending at Waist  []  No Stooping          []  No Lifting                                                                            [] No Carrying                []  No Walking           []  Lifting Restricted to (lbs.):   [] No Pushing                 []  No Climbing         []  No Reaching Above Shoulders   Date of Next Visit:  none Date of this Exam:  4/19/2024 Physician/Chiropractic Physician Name: LUCIEN Cavazos Physician/Chiropractic Physician Signature:   David Pierre DO MPH   D-39 (Rev. 2/24)
